# Patient Record
Sex: MALE | Race: WHITE | NOT HISPANIC OR LATINO | Employment: OTHER | URBAN - METROPOLITAN AREA
[De-identification: names, ages, dates, MRNs, and addresses within clinical notes are randomized per-mention and may not be internally consistent; named-entity substitution may affect disease eponyms.]

---

## 2024-01-23 ENCOUNTER — APPOINTMENT (OUTPATIENT)
Dept: CT IMAGING | Facility: CLINIC | Age: 69
End: 2024-01-23
Attending: STUDENT IN AN ORGANIZED HEALTH CARE EDUCATION/TRAINING PROGRAM

## 2024-01-23 ENCOUNTER — HOSPITAL ENCOUNTER (EMERGENCY)
Facility: CLINIC | Age: 69
Discharge: HOME | End: 2024-01-23
Attending: STUDENT IN AN ORGANIZED HEALTH CARE EDUCATION/TRAINING PROGRAM | Admitting: STUDENT IN AN ORGANIZED HEALTH CARE EDUCATION/TRAINING PROGRAM

## 2024-01-23 VITALS
OXYGEN SATURATION: 97 % | HEIGHT: 68 IN | BODY MASS INDEX: 21.22 KG/M2 | DIASTOLIC BLOOD PRESSURE: 78 MMHG | RESPIRATION RATE: 18 BRPM | WEIGHT: 140 LBS | TEMPERATURE: 97.7 F | SYSTOLIC BLOOD PRESSURE: 151 MMHG | HEART RATE: 75 BPM

## 2024-01-23 DIAGNOSIS — R13.10 PAINFUL SWALLOWING: ICD-10-CM

## 2024-01-23 LAB
ANION GAP SERPL CALCULATED.3IONS-SCNC: 12 MMOL/L (ref 7–15)
BUN SERPL-MCNC: 16.9 MG/DL (ref 8–23)
CALCIUM SERPL-MCNC: 8.9 MG/DL (ref 8.8–10.2)
CHLORIDE SERPL-SCNC: 101 MMOL/L (ref 98–107)
CREAT SERPL-MCNC: 0.87 MG/DL (ref 0.67–1.17)
DEPRECATED HCO3 PLAS-SCNC: 22 MMOL/L (ref 22–29)
EGFRCR SERPLBLD CKD-EPI 2021: >90 ML/MIN/1.73M2
GLUCOSE SERPL-MCNC: 83 MG/DL (ref 70–99)
POTASSIUM SERPL-SCNC: 4.4 MMOL/L (ref 3.4–5.3)
SODIUM SERPL-SCNC: 135 MMOL/L (ref 135–145)

## 2024-01-23 PROCEDURE — 36415 COLL VENOUS BLD VENIPUNCTURE: CPT | Performed by: STUDENT IN AN ORGANIZED HEALTH CARE EDUCATION/TRAINING PROGRAM

## 2024-01-23 PROCEDURE — 99285 EMERGENCY DEPT VISIT HI MDM: CPT | Mod: 25

## 2024-01-23 PROCEDURE — 250N000011 HC RX IP 250 OP 636: Performed by: STUDENT IN AN ORGANIZED HEALTH CARE EDUCATION/TRAINING PROGRAM

## 2024-01-23 PROCEDURE — 80048 BASIC METABOLIC PNL TOTAL CA: CPT | Performed by: STUDENT IN AN ORGANIZED HEALTH CARE EDUCATION/TRAINING PROGRAM

## 2024-01-23 PROCEDURE — 70491 CT SOFT TISSUE NECK W/DYE: CPT | Mod: MG

## 2024-01-23 PROCEDURE — 250N000013 HC RX MED GY IP 250 OP 250 PS 637: Performed by: STUDENT IN AN ORGANIZED HEALTH CARE EDUCATION/TRAINING PROGRAM

## 2024-01-23 RX ORDER — MAGNESIUM HYDROXIDE/ALUMINUM HYDROXICE/SIMETHICONE 120; 1200; 1200 MG/30ML; MG/30ML; MG/30ML
15 SUSPENSION ORAL ONCE
Status: COMPLETED | OUTPATIENT
Start: 2024-01-23 | End: 2024-01-23

## 2024-01-23 RX ORDER — IOPAMIDOL 755 MG/ML
90 INJECTION, SOLUTION INTRAVASCULAR ONCE
Status: COMPLETED | OUTPATIENT
Start: 2024-01-23 | End: 2024-01-23

## 2024-01-23 RX ADMIN — ALUMINUM HYDROXIDE, MAGNESIUM HYDROXIDE, AND SIMETHICONE 15 ML: 200; 200; 20 SUSPENSION ORAL at 03:24

## 2024-01-23 RX ADMIN — IOPAMIDOL 90 ML: 755 INJECTION, SOLUTION INTRAVENOUS at 04:27

## 2024-01-23 ASSESSMENT — ACTIVITIES OF DAILY LIVING (ADL)
ADLS_ACUITY_SCORE: 33
ADLS_ACUITY_SCORE: 35

## 2024-01-23 NOTE — DISCHARGE INSTRUCTIONS
Thankfully your scan did not show any serious findings  It did show some thyroid nodules for which you will need follow-up on.  Please follow-up closely with a primary care doctor.  Recommend a soft diet for next couple of days  Return to the emergency room with significant increase in neck or chest pain, difficulty breathing, unable to swallow food or fluids or saliva, fevers or other worsening symptoms or concerns

## 2024-01-23 NOTE — ED PROVIDER NOTES
NAME: Ricardo Lay  AGE: 68 year old male  YOB: 1955  MRN: [id number]  EVALUATION DATE & TIME: No admission date for patient encounter.    PCP: No primary care provider on file.  ED PROVIDER: Taylor Iqbal MD.    Chief Complaint   Patient presents with    Swallowed Foreign Body     Pork        FINAL IMPRESSION:  1. Painful swallowing        MEDICAL DECISION MAKING:    3:11 AM I met with the patient, obtained history, performed an initial exam, and discussed options and plan for diagnostics and treatment here in the ED.   5:08 AM I rechecked and updated patient.     MDM: 68-year-old male with remote history of alcohol use presents with pain with swallowing. On Saturday (4 days ago) he was eating a piece of pork when he felt like it got stuck in his throat.  He later felt like the for did go down but he is continuing to have right sided throat/neck pain with swallowing.  He is able to handle his secretions and fluids, no vomiting.  I do not think that he has an esophageal obstruction. BMP is reassuring. CT neck showed multinodular thyroid without other acute findings. Did have some improvement with maalox here. No crepitance, clinically well appearing, no chest pain, do not suspect boerhaave's. Recommended close outpatient follow up. Strict return precautions discussed and patient is in agreement with plan, endorses understanding and his questions were answered.    Medical Decision Denisa    History:  Supplemental history from: Documented in chart  External Record(s) reviewed: Documented in chart    Work Up:  Chart documentation includes differential considered and any EKGs or imaging interpreted by provider.  In additional to work up documented, I considered the following work up: Documented in chart, if applicable.    External consultation:  Discussion of management with another provider: Documented in chart, if applicable    Complicating factors:  Care impacted by chronic illness: N/A  Care  "affected by social determinants of health: Access to Medical Care    Disposition considerations: Discharge. No recommendations on prescription strength medication(s). N/A.    MEDICATIONS GIVEN IN THE EMERGENCY:  Medications   alum & mag hydroxide-simethicone (MAALOX) suspension 15 mL (15 mLs Oral $Given 1/23/24 0322)   iopamidol (ISOVUE-370) solution 90 mL (90 mLs Intravenous $Given 1/23/24 8570)       NEW PRESCRIPTIONS STARTED AT TODAY'S ER VISIT:  New Prescriptions    No medications on file        =================================================================  HPI    Patient information was obtained from: Patient  Use of : N/A       Ricardo Lay is a 68 year old male with no pertinent past medical history, who presents for swallowed foreign body.     The patient reports that he ate a piece of pork spare rib on Saturday evening (1/20). He said that he believes a piece of pork got stuck underneath his \"Lico's apple.\" Patient reports that he is able to keep liquids down, but he has not eaten any food since Saturday. This is due to him fasting by choice. He said that when he swallows, the affected area feels \"sore.\" He thought his discomfort would get better, but he said it is worse.     Patient denies vomiting or difficulty breathing. He takes no daily medications. Patient has never had a scope down his throat. No history of hematemesis. 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    PAST MEDICAL HISTORY:  History reviewed. No pertinent past medical history.    PAST SURGICAL HISTORY:  History reviewed. No pertinent surgical history.    CURRENT MEDICATIONS:    No current facility-administered medications for this encounter.  No current outpatient medications on file.    ALLERGIES:  No Known Allergies    FAMILY HISTORY:  History reviewed. No pertinent family history.    SOCIAL HISTORY:        PHYSICAL EXAM:    Vitals: BP (!) 149/92   Pulse 103   Temp 97.7  F (36.5  C) (Oral)  " " Resp 18   Ht 1.727 m (5' 8\")   Wt 63.5 kg (140 lb)   SpO2 98%   BMI 21.29 kg/m     Constitutional: Well developed, well nourished. Comfortable appearing  HENT: Normocephalic, atraumatic, mucous membranes moist, nose normal  Neck: supple points to right side of neck as area of pain, no swelling, no palpable masses, no erythema, normal ROM of neck  Eyes: Pupils mid range, sclera white, no discharge  Respiratory: Lungs CTAB. Normal work of breathing, normal rate, speaks in full sentences. No chest wall crepitance.  Cardiovascular: Normal heart rate and rhythm  Musculoskeletal: Moving all 4 extremities intentionally and without pain.  Neurologic: Alert & oriented, cranial nerves grossly intact. Speech clear. No focal deficits noted    LAB:  All pertinent labs reviewed and interpreted.  Labs Ordered and Resulted from Time of ED Arrival to Time of ED Departure   BASIC METABOLIC PANEL - Normal       Result Value    Sodium 135      Potassium 4.4      Chloride 101      Carbon Dioxide (CO2) 22      Anion Gap 12      Urea Nitrogen 16.9      Creatinine 0.87      GFR Estimate >90      Calcium 8.9      Glucose 83         RADIOLOGY:  Soft tissue neck CT w contrast   Final Result   IMPRESSION:    1.  No evidence of an acute abnormality or radiodense foreign body.   2.  Multinodular thyroid gland with a dominant nodule on the left measuring up to 3.5 cm in diameter.      REFERENCE:   Luis Fernando HICKMAN et al. Managing Incidental Thyroid Nodules Detected on Imaging: White Paper of the ACR Incidental Thyroid Findings Committee. JACR 2015; 12:143-150.      Incidental thyroid nodule detected on CT or MRI without suspicious findings. Applies to general population without limited life expectancy or significant comorbidities.      Age greater than or equal to 35 years   Less than 1.5 cm: No further evaluation.   Greater than or equal to 1.5 cm: Evaluate with thyroid ultrasound.                                 EKG:   N/A    PROCEDURES: "   Procedures     I, Jessica Beck, am serving as a scribe to document services personally performed by Dr. Taylor Iqbal based on my observation and the provider's statements to me. I, Taylor Iqbal MD attest that Jessica Beck is acting in a scribe capacity, has observed my performance of the services and has documented them in accordance with my direction.    Taylor Iqbal M.D.  Emergency Medicine  [facility] EMERGENCY ROOM  [facility] [address]  [phone]  Dept: [phone]       Taylor Iqbal MD  01/23/24 0513

## 2024-01-23 NOTE — ED TRIAGE NOTES
On 1/20/24 patient reports getting a piece of pork stuck in his throat. Reports that he is still having pain. Is able to keep down water. Has not tried to eat food      Triage Assessment (Adult)       Row Name 01/23/24 0258          Triage Assessment    Airway WDL WDL        Respiratory WDL    Respiratory WDL WDL        Skin Circulation/Temperature WDL    Skin Circulation/Temperature WDL WDL        Cardiac WDL    Cardiac WDL WDL        Peripheral/Neurovascular WDL    Peripheral Neurovascular WDL WDL        Cognitive/Neuro/Behavioral WDL    Cognitive/Neuro/Behavioral WDL WDL